# Patient Record
Sex: MALE | Race: ASIAN | NOT HISPANIC OR LATINO
[De-identification: names, ages, dates, MRNs, and addresses within clinical notes are randomized per-mention and may not be internally consistent; named-entity substitution may affect disease eponyms.]

---

## 2019-01-07 PROBLEM — Z00.00 ENCOUNTER FOR PREVENTIVE HEALTH EXAMINATION: Status: ACTIVE | Noted: 2019-01-07

## 2019-01-09 ENCOUNTER — TRANSCRIPTION ENCOUNTER (OUTPATIENT)
Age: 49
End: 2019-01-09

## 2019-01-09 ENCOUNTER — APPOINTMENT (OUTPATIENT)
Dept: COLORECTAL SURGERY | Facility: CLINIC | Age: 49
End: 2019-01-09
Payer: COMMERCIAL

## 2019-01-09 VITALS
HEART RATE: 62 BPM | HEIGHT: 72.83 IN | WEIGHT: 186 LBS | BODY MASS INDEX: 24.65 KG/M2 | DIASTOLIC BLOOD PRESSURE: 100 MMHG | SYSTOLIC BLOOD PRESSURE: 144 MMHG | TEMPERATURE: 98.5 F

## 2019-01-09 DIAGNOSIS — Z82.49 FAMILY HISTORY OF ISCHEMIC HEART DISEASE AND OTHER DISEASES OF THE CIRCULATORY SYSTEM: ICD-10-CM

## 2019-01-09 DIAGNOSIS — Z83.3 FAMILY HISTORY OF DIABETES MELLITUS: ICD-10-CM

## 2019-01-09 PROCEDURE — 46600 DIAGNOSTIC ANOSCOPY SPX: CPT

## 2019-01-09 PROCEDURE — 99202 OFFICE O/P NEW SF 15 MIN: CPT | Mod: 25

## 2019-01-09 RX ORDER — MULTIVITAMIN
TABLET ORAL
Refills: 0 | Status: ACTIVE | COMMUNITY

## 2019-01-11 NOTE — PHYSICAL EXAM
[Fistula] : a fistula [5:00] : in the 5:00 position [Normal] : was normal [None] : no [Excoriation] : no perianal excoriation [FreeTextEntry1] : A lighted anoscope was passed into the anal canal and the entire anal mucosal surface was inspected..  THe findings revealed /mod internal hemorrhoids. No masses or lesions were identified.\par \par

## 2019-01-11 NOTE — ASSESSMENT
[FreeTextEntry1] : Arrange MRI to assess the extent of sphincter involvement of the fistula.  THe role of surgery was reviewed.\par \par The risks and befits of the treatment plan were reviewed and outlined with the patient including leakage, seepage and recurrence.. The patient understands the associated risks of the involved treatment and wishes to proceed. All questions were answered and explained.\par \par Will plan for rubber band ligation of hemorrhoids after repair of the fistula.\par \par I reviewed with the patient the treatment options for internal hemorrhoids. At this time I have suggested the patient consider increasing daily fiber intake (fiber guidelines both synthetic and dietary were reviewed), increasing water, and stool softeners as necessary. The role of medical and surgical therapy has been outlined. The risks, benefits and alternatives including post procedure pain, bleeding, infection, and the need for future procedures have been discussed.\par

## 2019-01-11 NOTE — HISTORY OF PRESENT ILLNESS
[FreeTextEntry1] : 47 yo M presents for evaluation of anal fistula and hemorrhoids\par \par Admits has had intermittent hemorrhoidal symptoms for 10 years, however BRP began approx 6 months ago \par Approx 6 months ago, experienced pain, itching, BRBPR w/ some BMs over the last few months\par BH: 1-2 x daily, soft, formed\par Denies straining.\par Denies use of OTC medications or fiber supplements\par Adequate fiber and water intake\par \par Beginning in 2013 felt hardened area near right buttocks ?anus, then two years ago, started getting larger and draining on own w/ yellow or white pus\par Completed US imaging 1/2018 for evaluation of fistula\par Presently experiencing mild pain and itching.\par Denies hx of treatment, using saline water to wash\par Denies fecal urgency or incontinence\par Has never had a colonoscopy\par Denies [facility] GI conditions or cancer\par Denies ASA/NSAIDs in last 7 days

## 2019-01-11 NOTE — CONSULT LETTER
[Dear  ___] : Dear  [unfilled], [Consult Letter:] : I had the pleasure of evaluating your patient, [unfilled]. [( Thank you for referring [unfilled] for consultation for _____ )] : Thank you for referring [unfilled] for consultation for [unfilled] [Please see my note below.] : Please see my note below. [Consult Closing:] : Thank you very much for allowing me to participate in the care of this patient.  If you have any questions, please do not hesitate to contact me. [Sincerely,] : Sincerely, [FreeTextEntry2] : Dr. Aranza GALINDO\par [address] \[place] [address] [FreeTextEntry3] : Jaylen Ashley MD

## 2019-01-16 ENCOUNTER — FORM ENCOUNTER (OUTPATIENT)
Age: 49
End: 2019-01-16

## 2019-01-17 ENCOUNTER — OUTPATIENT (OUTPATIENT)
Dept: OUTPATIENT SERVICES | Facility: HOSPITAL | Age: 49
LOS: 1 days | End: 2019-01-17

## 2019-01-17 ENCOUNTER — APPOINTMENT (OUTPATIENT)
Dept: MRI IMAGING | Facility: CLINIC | Age: 49
End: 2019-01-17
Payer: COMMERCIAL

## 2019-01-17 PROCEDURE — 72197 MRI PELVIS W/O & W/DYE: CPT | Mod: 26

## 2019-01-23 ENCOUNTER — APPOINTMENT (OUTPATIENT)
Dept: COLORECTAL SURGERY | Facility: CLINIC | Age: 49
End: 2019-01-23
Payer: COMMERCIAL

## 2019-01-23 VITALS
TEMPERATURE: 98.7 F | BODY MASS INDEX: 24.12 KG/M2 | WEIGHT: 182 LBS | HEIGHT: 72.83 IN | HEART RATE: 70 BPM | SYSTOLIC BLOOD PRESSURE: 139 MMHG | DIASTOLIC BLOOD PRESSURE: 95 MMHG

## 2019-01-23 DIAGNOSIS — K60.3 ANAL FISTULA: ICD-10-CM

## 2019-01-23 PROCEDURE — 99214 OFFICE O/P EST MOD 30 MIN: CPT

## 2019-01-23 NOTE — HISTORY OF PRESENT ILLNESS
[FreeTextEntry1] : 49 y/o Mandarin speaking M presents for f/u \par Reports small amount of drainage when cleaning, 2 days ago noticed BRBPR dripping in to the bowl\par Denies pain, fever, chills, odor\par BH: Daily\par Denies issues with BM's\par on exam 1/91/19 fistula noted in the 5:00 position\par MRI done 1/17/19\par - no abscess or fistula noted\par - symmetric diffuse T2 hypointensity of the prostatic peripheral zones

## 2019-01-23 NOTE — PHYSICAL EXAM
[Fistula] : a fistula [Normal] : was normal [None] : there was no rectal mass  [de-identified] : right anterior anal margin - closed fistula site with subcutaneous induration  and tenderness

## 2019-01-23 NOTE — ASSESSMENT
[FreeTextEntry1] : I have reviewed with the patient my suspicion of an underlying anal fistula given the findings of recent ultrasound and despite the absence of findings on MRI. We will plan to proceed with a anal fistulotomy with the understanding that he may require a second stage operation is extensive sphincter involvement is appreciated..\par \par The associated risks, benefits and alternatives of the treatment plan were reviewed including but not limited to risk of permanent leakage, seepage, incontinence and need for future procedures

## 2019-02-21 ENCOUNTER — APPOINTMENT (OUTPATIENT)
Dept: COLORECTAL SURGERY | Facility: HOSPITAL | Age: 49
End: 2019-02-21

## 2019-02-21 ENCOUNTER — OUTPATIENT (OUTPATIENT)
Dept: OUTPATIENT SERVICES | Facility: HOSPITAL | Age: 49
LOS: 1 days | Discharge: HOME | End: 2019-02-21
Payer: COMMERCIAL

## 2019-02-21 PROCEDURE — 46275 REMOVE ANAL FIST INTER: CPT | Mod: GC

## 2019-02-21 RX ORDER — DOCUSATE SODIUM 100 MG
1 CAPSULE ORAL
Qty: 8 | Refills: 0 | OUTPATIENT
Start: 2019-02-21

## 2019-03-18 ENCOUNTER — APPOINTMENT (OUTPATIENT)
Dept: COLORECTAL SURGERY | Facility: CLINIC | Age: 49
End: 2019-03-18

## 2019-11-01 ENCOUNTER — APPOINTMENT (OUTPATIENT)
Dept: COLORECTAL SURGERY | Facility: CLINIC | Age: 49
End: 2019-11-01

## 2019-12-02 ENCOUNTER — APPOINTMENT (OUTPATIENT)
Dept: COLORECTAL SURGERY | Facility: CLINIC | Age: 49
End: 2019-12-02

## 2020-01-10 ENCOUNTER — APPOINTMENT (OUTPATIENT)
Dept: COLORECTAL SURGERY | Facility: CLINIC | Age: 50
End: 2020-01-10
Payer: COMMERCIAL

## 2020-01-10 VITALS
WEIGHT: 177 LBS | SYSTOLIC BLOOD PRESSURE: 130 MMHG | TEMPERATURE: 98 F | BODY MASS INDEX: 23.98 KG/M2 | DIASTOLIC BLOOD PRESSURE: 83 MMHG | HEART RATE: 74 BPM | HEIGHT: 72 IN

## 2020-01-10 DIAGNOSIS — K64.8 OTHER HEMORRHOIDS: ICD-10-CM

## 2020-01-10 PROCEDURE — 99213 OFFICE O/P EST LOW 20 MIN: CPT | Mod: 25

## 2020-01-10 PROCEDURE — 46221 LIGATION OF HEMORRHOID(S): CPT

## 2020-01-10 RX ORDER — PSYLLIUM HUSK 0.4 G
CAPSULE ORAL
Refills: 0 | Status: ACTIVE | COMMUNITY

## 2020-01-10 RX ORDER — DOCUSATE SODIUM 100 MG/1
100 CAPSULE ORAL
Qty: 60 | Refills: 1 | Status: ACTIVE | COMMUNITY
Start: 2020-01-10 | End: 1900-01-01

## 2020-01-13 NOTE — PHYSICAL EXAM
[Normal] : was normal [None] : there was no rectal mass  [Fistula] : no fistulas [FreeTextEntry1] : A lighted anoscope was passed into the anal canal and the entire anal mucosal surface was inspected..  The findings revealed moderate internal hemorrhoids. No masses or lesions were identified.\par \par The risks and benefits of rubber band ligation were discussed with the patient including but not limited to bleeding, pain, infection, and the need for future procedures. The anoscope was placed and rubber band ligation was performed of the internal hemorrhoids - left l lateral with good result. The patient tolerated the procedure well. Appropriate postprocedure instructions were given to the patient.\par

## 2020-01-13 NOTE — CONSULT LETTER
[Dear  ___] : Dear  [unfilled], [Consult Letter:] : I had the pleasure of evaluating your patient, [unfilled]. [( Thank you for referring [unfilled] for consultation for _____ )] : Thank you for referring [unfilled] for consultation for [unfilled] [Please see my note below.] : Please see my note below. [Consult Closing:] : Thank you very much for allowing me to participate in the care of this patient.  If you have any questions, please do not hesitate to contact me. [Sincerely,] : Sincerely, [FreeTextEntry2] : STACY GALINDO\par  [FreeTextEntry3] : JONEL YBARRA MD\par

## 2020-01-13 NOTE — HISTORY OF PRESENT ILLNESS
[FreeTextEntry1] : 50 yo Mandarin speaking M presents for f/u anal fistula, s/p fistulotomy to right anterior on 2/21/2019 and evaluation of rectal bleeding and hemorrhoids\par \par Pt never returned for post op visit, and cancelled 12/2019 visit due to rectal bleeding as per daughter. Patient traveled to [place] in interim and was supposed to address rectal bleeding while abroad\par As per patient, seen by provider in [place] who only recommended that he complete a colonoscopy\par \par Pt presents today c/o rectal bleeding which began 4-5 months ago. hx of BPR occurring daily, then improved. Patient previously reported at initial visit hx of intermittent hemorrhoidal symptoms for the last 10 years and BPR which began mid 2018. Mild-moderate hemorrhoids noted on exam, not addressed at that time due to treatment for fistula\par \par As of last week, bleeding became daily. Admits to straining\par Heavy BRBPR noted w/ stool and drips into toilet bowl\par + pain, "feels like skin coming apart" worse during BMs, continues to feels cutting, pinching pain\par (+) itching\par (+) prolapsing hemorrhoid that sometimes requires manual reduction w/ vasoline\par Has not tried any topical medications\par \par BH: daily, loose stool which requires straining. Sits on toilet for 10 minutes and feels he's unable to evacuate completely\par Reports adequate dietary fiber and water intake\par Has never had a colonoscopy\par Denies [facility] colorectal CA\par No ASA/NSAID use